# Patient Record
Sex: MALE | Race: OTHER | HISPANIC OR LATINO | Employment: FULL TIME | URBAN - METROPOLITAN AREA
[De-identification: names, ages, dates, MRNs, and addresses within clinical notes are randomized per-mention and may not be internally consistent; named-entity substitution may affect disease eponyms.]

---

## 2022-01-04 ENCOUNTER — HOSPITAL ENCOUNTER (EMERGENCY)
Facility: HOSPITAL | Age: 27
Discharge: HOME | End: 2022-01-04
Attending: INTERNAL MEDICINE

## 2022-01-04 VITALS
WEIGHT: 159.39 LBS | SYSTOLIC BLOOD PRESSURE: 129 MMHG | RESPIRATION RATE: 17 BRPM | HEART RATE: 65 BPM | TEMPERATURE: 98.6 F | DIASTOLIC BLOOD PRESSURE: 82 MMHG | BODY MASS INDEX: 25.62 KG/M2 | HEIGHT: 66 IN | OXYGEN SATURATION: 99 %

## 2022-01-04 DIAGNOSIS — J06.9 VIRAL URI WITH COUGH: Primary | ICD-10-CM

## 2022-01-04 LAB — S PYO DNA THROAT QL NAA+PROBE: NORMAL

## 2022-01-04 PROCEDURE — 87651 STREP A DNA AMP PROBE: CPT | Performed by: PHYSICIAN ASSISTANT

## 2022-01-04 PROCEDURE — 87636 SARSCOV2 & INF A&B AMP PRB: CPT | Performed by: PHYSICIAN ASSISTANT

## 2022-01-04 PROCEDURE — 99283 EMERGENCY DEPT VISIT LOW MDM: CPT

## 2022-01-04 PROCEDURE — 99282 EMERGENCY DEPT VISIT SF MDM: CPT | Performed by: PHYSICIAN ASSISTANT

## 2022-01-04 NOTE — ED PROVIDER NOTES
History  Chief Complaint   Patient presents with    Cough     pt presents to ed for cough and sore throat that started saturday, needs covid test for work     This is a 26-year-old male with no significant past medical history presenting to the emergency department today for cough and sore throat  This is been ongoing for approximately 3 days  The patient notes he needs to COVID 19 test return to work  His cough is dry and his sore throat is generalized and worse with swallowing  He is also noting some generalized myalgias  He denies any chest pain or shortness of breath  The patient is not COVID-19 vaccinated  No fevers or chills  No nausea or vomiting  No ear pain or neck pain  No diarrhea or constipation  The patient denies other complaints at this time  History provided by:  Patient   used: No    Cough  Cough characteristics:  Non-productive  Sputum characteristics:  Nondescript  Severity:  Mild  Duration:  3 days  Timing:  Rare  Progression:  Waxing and waning  Chronicity:  New  Relieved by:  None tried  Worsened by:  Nothing  Ineffective treatments:  None tried  Associated symptoms: myalgias and sore throat    Associated symptoms: no chest pain, no chills, no diaphoresis, no ear fullness, no ear pain, no eye discharge, no fever, no headaches, no rash, no rhinorrhea, no shortness of breath, no sinus congestion, no weight loss and no wheezing    Risk factors: no recent infection        None       History reviewed  No pertinent past medical history  History reviewed  No pertinent surgical history  History reviewed  No pertinent family history  I have reviewed and agree with the history as documented      E-Cigarette/Vaping     E-Cigarette/Vaping Substances     Social History     Tobacco Use    Smoking status: Never Smoker    Smokeless tobacco: Never Used   Substance Use Topics    Alcohol use: Yes     Comment: social    Drug use: Not on file       Review of Systems Constitutional: Negative for appetite change, chills, diaphoresis, fever and weight loss  HENT: Positive for sore throat  Negative for dental problem, ear pain, rhinorrhea, sinus pressure and sinus pain  Eyes: Negative for discharge  Respiratory: Positive for cough  Negative for chest tightness, shortness of breath and wheezing  Cardiovascular: Negative for chest pain and palpitations  Gastrointestinal: Negative for abdominal pain, constipation, diarrhea, nausea and vomiting  Genitourinary: Negative for dysuria  Musculoskeletal: Positive for myalgias  Negative for neck pain and neck stiffness  Skin: Negative for rash and wound  Neurological: Negative for dizziness, seizures, syncope, weakness, light-headedness, numbness and headaches  Psychiatric/Behavioral: Negative for confusion  All other systems reviewed and are negative  Physical Exam  Physical Exam  Vitals and nursing note reviewed  Constitutional:       General: He is not in acute distress  Appearance: Normal appearance  He is normal weight  He is not ill-appearing, toxic-appearing or diaphoretic  HENT:      Head: Normocephalic and atraumatic  Right Ear: Tympanic membrane, ear canal and external ear normal  There is no impacted cerumen  Left Ear: Tympanic membrane, ear canal and external ear normal  There is no impacted cerumen  Nose: Nose normal       Mouth/Throat:      Mouth: Mucous membranes are moist       Pharynx: Posterior oropharyngeal erythema present  No oropharyngeal exudate  Comments: Erythema to the patient's posterior oropharynx without any exudates, uvular deviation, or evidence of peritonsillar abscess  Eyes:      General: No scleral icterus  Right eye: No discharge  Left eye: No discharge        Conjunctiva/sclera: Conjunctivae normal    Neck:      Comments: No tenderness to palpation to cervical spine or paracervical musculature bilaterally  Non meningeal  Cardiovascular:      Rate and Rhythm: Normal rate and regular rhythm  Pulses: Normal pulses  Heart sounds: Normal heart sounds  No murmur heard  No friction rub  No gallop  Comments: 2+ radial pulses bilaterally  Pulmonary:      Effort: Pulmonary effort is normal  No respiratory distress  Breath sounds: Normal breath sounds  No stridor  No wheezing, rhonchi or rales  Comments: Clear to auscultation bilaterally without adventitious breath sounds  Chest:      Chest wall: No tenderness  Abdominal:      General: Abdomen is flat  There is no distension  Palpations: Abdomen is soft  Tenderness: There is no abdominal tenderness  There is no right CVA tenderness, left CVA tenderness, guarding or rebound  Musculoskeletal:         General: Normal range of motion  Cervical back: Normal range of motion  No rigidity  Right lower leg: No edema  Left lower leg: No edema  Comments: Negative Marito sign bilaterally   Skin:     General: Skin is warm and dry  Capillary Refill: Capillary refill takes less than 2 seconds  Coloration: Skin is not jaundiced or pale  Neurological:      General: No focal deficit present  Mental Status: He is alert and oriented to person, place, and time  Mental status is at baseline     Psychiatric:         Mood and Affect: Mood normal          Behavior: Behavior normal          Vital Signs  ED Triage Vitals [01/04/22 0939]   Temperature Pulse Respirations Blood Pressure SpO2   98 6 °F (37 °C) 65 17 129/82 99 %      Temp Source Heart Rate Source Patient Position - Orthostatic VS BP Location FiO2 (%)   Oral Monitor -- Left arm --      Pain Score       4           Vitals:    01/04/22 0939   BP: 129/82   Pulse: 65         Visual Acuity      ED Medications  Medications - No data to display    Diagnostic Studies  Results Reviewed     Procedure Component Value Units Date/Time    Strep A PCR [[id number]]  (Normal) Collected: 01/04/22 1007    Lab Status: Final result Specimen: Throat Updated: 01/04/22 1057     STREP A PCR None Detected    COVID/FLU - 24 hour TAT [[id number]] Collected: 01/04/22 1007    Lab Status: In process Specimen: Nares from Nose Updated: 01/04/22 1013                 No orders to display              Procedures  Procedures         ED Course                               SBIRT 20yo+      Most Recent Value   SBIRT (25 yo +)    In order to provide better care to our patients, we are screening all of our patients for alcohol and drug use  Would it be okay to ask you these screening questions? No Filed at: 01/04/2022 0948   Initial Alcohol Screen: US AUDIT-C     1  How often do you have a drink containing alcohol? 0 Filed at: 01/04/2022 0948   3a  Male UNDER 65: How often do you have five or more drinks on one occasion? 0 Filed at: 01/04/2022 0948   3b  FEMALE Any Age, or MALE 65+: How often do you have 4 or more drinks on one occassion? 0 Filed at: 01/04/2022 0948   Audit-C Score 0 Filed at: 01/04/2022 3235   IRAM: How many times in the past year have you    Used an illegal drug or used a prescription medication for non-medical reasons? Never Filed at: 01/04/2022 0948                    MDM  Number of Diagnoses or Management Options  Viral URI with cough: new and requires workup  Diagnosis management comments: This is a 80-year-old male presenting to the emergency department today for sore throat, cough, myalgias, and for COVID-19 testing  Recent COVID-19 exposure  No fevers  Vital signs are stable  Patient is afebrile  Physical exam shows normal cardiac and pulmonary examination  COVID test performed  Strep test also performed  The patient is stable for discharge at this time  Recommending PCP follow-up as soon as possible  Strict return precautions were given  Quarantine instructions were given    Recommending over-the-counter cough and cold medication in addition to over-the-counter Tylenol and ibuprofen for symptomatic relief  The patient verifies understanding and agrees the plan at this time  All questions answered to the patient's satisfaction  Amount and/or Complexity of Data Reviewed  Clinical lab tests: ordered and reviewed    Patient Progress  Patient progress: stable      Disposition  Final diagnoses:   Viral URI with cough     Time reflects when diagnosis was documented in both MDM as applicable and the Disposition within this note     Time User Action Codes Description Comment    1/4/2022  9:55 AM Druckenmiller, Virgia Heimlich Add [J06 9] Viral URI with cough       ED Disposition     ED Disposition Condition Date/Time Comment    Discharge Stable Tue Jan 4, 2022 [address] discharge to home/self care  Follow-up Information     Follow up With Specialties Details Why Contact Info Additional [address] Emergency Department Emergency Medicine Go to  If symptoms worsen [address] [facility],[address] [id number]  [address] [facility] Emergency Department, [address], [address] [place] [address] Medicine Schedule an appointment as soon as possible for a visit   [address] [phone]  [address] , [place], [place], [address]          There are no discharge medications for this patient  No discharge procedures on file      PDMP Review     None          ED Provider  Electronically Signed by           Liliam Salas PA-C  01/04/22 9117

## 2022-01-08 LAB
FLUAV RNA RESP QL NAA+PROBE: NEGATIVE
FLUBV RNA RESP QL NAA+PROBE: NEGATIVE
SARS-COV-2 RNA RESP QL NAA+PROBE: NEGATIVE

## 2022-01-08 NOTE — ED NOTES
Pt came to ED requesting note for work on covid results  Note given at this time       Rowena Lozano RN  01/08/22 1144

## 2022-11-28 ENCOUNTER — HOSPITAL ENCOUNTER (OUTPATIENT)
Dept: RADIOLOGY | Facility: HOSPITAL | Age: 27
Discharge: HOME | End: 2022-11-28

## 2022-11-28 DIAGNOSIS — R76.12 POSITIVE QUANTIFERON-TB GOLD TEST: ICD-10-CM
